# Patient Record
Sex: MALE | Race: WHITE | Employment: FULL TIME | URBAN - NONMETROPOLITAN AREA
[De-identification: names, ages, dates, MRNs, and addresses within clinical notes are randomized per-mention and may not be internally consistent; named-entity substitution may affect disease eponyms.]

---

## 2020-01-08 ENCOUNTER — HOSPITAL ENCOUNTER (EMERGENCY)
Age: 50
Discharge: HOME | End: 2020-01-08
Attending: EMERGENCY MEDICINE
Payer: COMMERCIAL

## 2020-01-08 VITALS
HEART RATE: 99 BPM | OXYGEN SATURATION: 98 % | SYSTOLIC BLOOD PRESSURE: 123 MMHG | TEMPERATURE: 97.4 F | RESPIRATION RATE: 18 BRPM | DIASTOLIC BLOOD PRESSURE: 86 MMHG

## 2020-01-08 LAB
ABSOLUTE EOS #: 0.45 K/UL (ref 0–0.44)
ABSOLUTE IMMATURE GRANULOCYTE: <0.03 K/UL (ref 0–0.3)
ABSOLUTE LYMPH #: 2.05 K/UL (ref 1.1–3.7)
ABSOLUTE MONO #: 0.73 K/UL (ref 0.1–1.2)
ANION GAP SERPL CALCULATED.3IONS-SCNC: 15 MMOL/L (ref 9–17)
BASOPHILS # BLD: 1 % (ref 0–2)
BASOPHILS ABSOLUTE: 0.08 K/UL (ref 0–0.2)
BUN BLDV-MCNC: 13 MG/DL (ref 6–20)
BUN/CREAT BLD: 16 (ref 9–20)
CALCIUM SERPL-MCNC: 8.9 MG/DL (ref 8.6–10.4)
CHLORIDE BLD-SCNC: 96 MMOL/L (ref 98–107)
CO2: 24 MMOL/L (ref 20–31)
CREAT SERPL-MCNC: 0.79 MG/DL (ref 0.7–1.2)
DIFFERENTIAL TYPE: ABNORMAL
EOSINOPHILS RELATIVE PERCENT: 6 % (ref 1–4)
GFR AFRICAN AMERICAN: >60 ML/MIN
GFR NON-AFRICAN AMERICAN: >60 ML/MIN
GFR SERPL CREATININE-BSD FRML MDRD: ABNORMAL ML/MIN/{1.73_M2}
GFR SERPL CREATININE-BSD FRML MDRD: ABNORMAL ML/MIN/{1.73_M2}
GLUCOSE BLD-MCNC: 103 MG/DL (ref 70–99)
HCT VFR BLD CALC: 44.6 % (ref 40.7–50.3)
HEMOGLOBIN: 15.3 G/DL (ref 13–17)
IMMATURE GRANULOCYTES: 0 %
LYMPHOCYTES # BLD: 25 % (ref 24–43)
MAGNESIUM: 2.1 MG/DL (ref 1.6–2.6)
MCH RBC QN AUTO: 31.1 PG (ref 25.2–33.5)
MCHC RBC AUTO-ENTMCNC: 34.3 G/DL (ref 28.4–34.8)
MCV RBC AUTO: 90.7 FL (ref 82.6–102.9)
MONOCYTES # BLD: 9 % (ref 3–12)
NRBC AUTOMATED: 0 PER 100 WBC
PDW BLD-RTO: 12.1 % (ref 11.8–14.4)
PLATELET # BLD: 290 K/UL (ref 138–453)
PLATELET ESTIMATE: ABNORMAL
PMV BLD AUTO: 9.3 FL (ref 8.1–13.5)
POTASSIUM SERPL-SCNC: 3.3 MMOL/L (ref 3.7–5.3)
RBC # BLD: 4.92 M/UL (ref 4.21–5.77)
RBC # BLD: ABNORMAL 10*6/UL
SEG NEUTROPHILS: 59 % (ref 36–65)
SEGMENTED NEUTROPHILS ABSOLUTE COUNT: 4.78 K/UL (ref 1.5–8.1)
SODIUM BLD-SCNC: 135 MMOL/L (ref 135–144)
TROPONIN INTERP: NORMAL
TROPONIN T: <0.03 NG/ML
TROPONIN, HIGH SENSITIVITY: NORMAL NG/L (ref 0–22)
TSH SERPL DL<=0.05 MIU/L-ACNC: 12.18 MIU/L (ref 0.3–5)
WBC # BLD: 8.1 K/UL (ref 3.5–11.3)
WBC # BLD: ABNORMAL 10*3/UL

## 2020-01-08 PROCEDURE — 93005 ELECTROCARDIOGRAM TRACING: CPT | Performed by: EMERGENCY MEDICINE

## 2020-01-08 PROCEDURE — 84484 ASSAY OF TROPONIN QUANT: CPT

## 2020-01-08 PROCEDURE — 99283 EMERGENCY DEPT VISIT LOW MDM: CPT

## 2020-01-08 PROCEDURE — 6370000000 HC RX 637 (ALT 250 FOR IP): Performed by: EMERGENCY MEDICINE

## 2020-01-08 PROCEDURE — 85025 COMPLETE CBC W/AUTO DIFF WBC: CPT

## 2020-01-08 PROCEDURE — 36415 COLL VENOUS BLD VENIPUNCTURE: CPT

## 2020-01-08 PROCEDURE — 80048 BASIC METABOLIC PNL TOTAL CA: CPT

## 2020-01-08 PROCEDURE — 84443 ASSAY THYROID STIM HORMONE: CPT

## 2020-01-08 PROCEDURE — 83735 ASSAY OF MAGNESIUM: CPT

## 2020-01-08 RX ORDER — CLONIDINE HYDROCHLORIDE 0.1 MG/1
0.2 TABLET ORAL ONCE
Status: COMPLETED | OUTPATIENT
Start: 2020-01-08 | End: 2020-01-08

## 2020-01-08 RX ADMIN — CLONIDINE HYDROCHLORIDE 0.2 MG: 0.1 TABLET ORAL at 22:18

## 2020-01-09 LAB
EKG ATRIAL RATE: 89 BPM
EKG P AXIS: 56 DEGREES
EKG P-R INTERVAL: 150 MS
EKG Q-T INTERVAL: 388 MS
EKG QRS DURATION: 100 MS
EKG QTC CALCULATION (BAZETT): 472 MS
EKG R AXIS: 57 DEGREES
EKG T AXIS: 32 DEGREES
EKG VENTRICULAR RATE: 89 BPM

## 2020-01-09 PROCEDURE — 93010 ELECTROCARDIOGRAM REPORT: CPT | Performed by: FAMILY MEDICINE

## 2020-01-09 ASSESSMENT — ENCOUNTER SYMPTOMS
VOMITING: 0
NAUSEA: 0
BACK PAIN: 0
SORE THROAT: 0
ABDOMINAL PAIN: 0
COUGH: 0
COLOR CHANGE: 0
SHORTNESS OF BREATH: 0

## 2020-01-09 NOTE — ED PROVIDER NOTES
[facility] ED  eMERGENCY dEPARTMENT eNCOUnter      Pt Name: Angel Mcmillan  MRN: [id number]  [place] 1970  Date of evaluation: 1/8/2020  Provider: Yee Hyatt DO     CHIEF COMPLAINT       Chief Complaint   Patient presents with    Hypertension     takes B/P at home and is reading high, feels weird         HISTORY OF PRESENT ILLNESS   (Location/Symptom, Timing/Onset, Context/Setting, Quality, Duration, Modifying Factors, Severity) Note limiting factors. HPI    Angel Mcmillan is a 44 y.o. male who presents to the emergency department with complaint of hypertension and palpitations. Patient reports that he has a past medical history of hypertension. He states that he takes Norvasc for this. He reports that this evening he was \"not feeling right\". He states he felt his heart was beating fast and he checked his blood pressure and it was elevated from his baseline. He denies any chest pain shortness of breath headache or change in vision. He states that he has a coworker who recently underwent triple bypass and patient had concern for his heart and secondary to this presents for evaluation    Nursing Notes were reviewed. REVIEW OF SYSTEMS    (2+ for level 4; 10+ for level 5)   Review of Systems   Constitutional: Negative for chills and fever. HENT: Negative for congestion and sore throat. Eyes: Negative for visual disturbance. Respiratory: Negative for cough and shortness of breath. Cardiovascular: Positive for palpitations. Negative for chest pain and leg swelling. Gastrointestinal: Negative for abdominal pain, nausea and vomiting. Musculoskeletal: Negative for back pain. Skin: Negative for color change. Neurological: Negative for dizziness, weakness, light-headedness and headaches. All other systems reviewed and are negative.       PAST MEDICAL HISTORY     Past Medical History:   Diagnosis Date    Essential hypertension     Mixed hyperlipidemia        SURGICAL HISTORY       Past Surgical History:   Procedure Laterality Date    VASECTOMY  2003       CURRENT MEDICATIONS       Discharge Medication List as of 1/8/2020 11:29 PM      CONTINUE these medications which have NOT CHANGED    Details   amLODIPine (NORVASC) 5 MG tablet Take 1 tablet by mouth daily, Disp-90 tablet, R-3Normal      simvastatin (ZOCOR) 40 MG tablet Take 1 tablet by mouth nightly, Disp-90 tablet, R-3Normal             ALLERGIES     Patient has no known allergies.     FAMILY HISTORY       Family History   Problem Relation Age of Onset    High Blood Pressure Mother         SOCIAL HISTORY       Social History     Socioeconomic History    Marital status:      Spouse name: Not on file    Number of children: Not on file    Years of education: Not on file    Highest education level: Not on file   Occupational History     Employer: [employer]   Social Needs    Financial resource strain: Not hard at all   Gildardo-Art insecurity:     Worry: Never true     Inability: Never true    Transportation needs:     Medical: No     Non-medical: No   Tobacco Use    Smoking status: Former Smoker    Smokeless tobacco: Never Used    Tobacco comment: Quit 1999   Substance and Sexual Activity    Alcohol use: No     Alcohol/week: 0.0 standard drinks    Drug use: No    Sexual activity: Not on file   Lifestyle    Physical activity:     Days per week: 0 days     Minutes per session: 0 min    Stress: Not at all   Relationships    Social connections:     Talks on phone: Not on file     Gets together: Not on file     Attends Anabaptist service: Not on file     Active member of club or organization: Not on file     Attends meetings of clubs or organizations: Not on file     Relationship status: Not on file    Intimate partner violence:     Fear of current or ex partner: No     Emotionally abused: No     Physically abused: No     Forced sexual activity: No   Other Topics Concern    Not on file   Social History Narrative    Not on file       SCREENINGS    Mag Coma Scale  Eye Opening: Spontaneous  Best Verbal Response: Oriented  Best Motor Response: Obeys commands  [place] Coma Scale Score: 15      PHYSICAL EXAM    (up to 7 for level 4, 8 or more for level 5)   @[email or web address]    Physical Exam  Vitals signs and nursing note reviewed. Constitutional:       General: He is not in acute distress. Appearance: Normal appearance. He is not ill-appearing, toxic-appearing or diaphoretic. HENT:      Head: Normocephalic and atraumatic. Nose: Nose normal.      Mouth/Throat:      Mouth: Mucous membranes are moist.      Pharynx: Oropharynx is clear. No oropharyngeal exudate or posterior oropharyngeal erythema. Eyes:      General: No scleral icterus. Right eye: No discharge. Left eye: No discharge. Extraocular Movements: Extraocular movements intact. Conjunctiva/sclera: Conjunctivae normal.      Pupils: Pupils are equal, round, and reactive to light. Neck:      Musculoskeletal: Normal range of motion and neck supple. No neck rigidity or muscular tenderness. Vascular: No carotid bruit. Cardiovascular:      Rate and Rhythm: Normal rate and regular rhythm. Pulses: Normal pulses. Heart sounds: Normal heart sounds. No murmur. No friction rub. No gallop. Comments: Radial pulses are plus 2 out of 4 bilaterally they are equal and symmetric  Pulmonary:      Effort: Pulmonary effort is normal. No respiratory distress. Breath sounds: Normal breath sounds. No wheezing or rhonchi. Abdominal:      General: Abdomen is flat. Bowel sounds are normal. There is no distension. Palpations: Abdomen is soft. There is no mass. Tenderness: There is no tenderness. There is no guarding. Comments: No voluntary guarding or rigidity no pulsatile mass   Musculoskeletal: Normal range of motion. General: No swelling, tenderness, deformity or signs of injury.       Comments: No asymmetric tablet 0.2 mg (0.2 mg Oral Given 1/8/20 2218)       MDM. Patient arrived to the ER hypertensive but otherwise had no complaints or exam findings concerning for endorgan damage. However with his concern for cardiac damage from the blood pressure a work-up was obtained. Work-up revealed changes consistent with hypothyroidism otherwise no clinically significant findings. After medication with clonidine the patient's blood pressure normalized and therefore with overall negative work-up he will be discharged home with outpatient follow-up. REVAL:         CRITICAL CARE TIME   Total Critical Care time was minutes, excluding separately reportable procedures. There was a high probability of clinically significant/life threatening deterioration in the patient's condition which required my urgent intervention. CONSULTS:  None    PROCEDURES:  Unless otherwise noted below, none     Procedures    FINAL IMPRESSION      1. Accelerated hypertension    2. Hypothyroidism, unspecified type          DISPOSITION/PLAN   DISPOSITION Decision To Discharge 01/08/2020 11:28:50 PM      PATIENT REFERRED TO:  Poonam Borges MD  [place] [address], [address] [place] [address]  [phone]    Schedule an appointment as soon as possible for a visit in 5 days  For repeat evaluation      DISCHARGE MEDICATIONS:  Discharge Medication List as of 1/8/2020 11:29 PM             (Please note:  Portions of this note were completed with a voice recognition program.  Efforts were made to edit the dictations but occasionally words and phrases are mis-transcribed.)  Form v2016. J.5-cn    Albania Lafleur DO (electronically signed)  Emergency Medicine Provider        DO Lisa  01/09/20 0896

## 2020-02-20 ENCOUNTER — HOSPITAL ENCOUNTER (OUTPATIENT)
Dept: NON INVASIVE DIAGNOSTICS | Age: 50
Discharge: HOME | End: 2020-02-20
Payer: COMMERCIAL

## 2020-02-20 PROCEDURE — A9500 TC99M SESTAMIBI: HCPCS | Performed by: INTERNAL MEDICINE

## 2020-02-20 PROCEDURE — 3430000000 HC RX DIAGNOSTIC RADIOPHARMACEUTICAL: Performed by: INTERNAL MEDICINE

## 2020-02-20 PROCEDURE — 78452 HT MUSCLE IMAGE SPECT MULT: CPT

## 2020-02-20 PROCEDURE — 93017 CV STRESS TEST TRACING ONLY: CPT

## 2020-02-20 RX ADMIN — Medication 30 MILLICURIE: at 10:03

## 2020-02-21 ENCOUNTER — HOSPITAL ENCOUNTER (OUTPATIENT)
Dept: NON INVASIVE DIAGNOSTICS | Age: 50
Discharge: HOME | End: 2020-02-21
Payer: COMMERCIAL

## 2020-02-21 PROCEDURE — 3430000000 HC RX DIAGNOSTIC RADIOPHARMACEUTICAL: Performed by: INTERNAL MEDICINE

## 2020-02-21 PROCEDURE — A9500 TC99M SESTAMIBI: HCPCS | Performed by: INTERNAL MEDICINE

## 2020-02-21 PROCEDURE — 78452 HT MUSCLE IMAGE SPECT MULT: CPT

## 2020-02-21 RX ADMIN — Medication 30 MILLICURIE: at 12:32

## 2020-02-24 NOTE — PROCEDURES
[facility], [address] [place] [address]                              CARDIAC STRESS TEST    PATIENT NAME: Teri Young                 :        1970  MED REC NO:   [id number]                              ROOM:  ACCOUNT NO:   [de-identified]                           ADMIT DATE: 2020  PROVIDER:     Verito Renee. Elian Arenas    CARDIOVASCULAR DIAGNOSTIC DEPARTMENT    DATE OF STUDY:  2020    ORDERING PROVIDER:  Radha King. Jalyn Loyola MD    PRIMARY CARE PROVIDER:  Radha King. Jalyn Loyola MD    INTERPRETING PHYSICIAN:  James Arenas MD    EXERCISE MYOCARDIAL PERFUSION STRESS TEST REPORT    Stress/Rest single-isotope SPECT imaging with exercise stress and gated  SPECT imaging    INDICATION:  Assessment of recent chest pain and/or discomfort. CLINICAL HISTORY:  The patient is a 64-year-old man with no known  coronary artery disease. Previous cardiac history includes:  None. Other previous history includes:  Chest pain, palpitations, dyspnea,  hypertension. Family history of CAD <60 in father, MI at age 54. Symptoms just prior to testing included:  None. Relevant medications:  Amlodipine. PROCEDURE:  The patient performed treadmill exercise using a Ayad  protocol, completing 6:00 minutes and completing an estimated workload  of 9.36 metabolic equivalents (METS). The test was terminated due to ST depression, elevated blood pressure. The heart rate was 84 beats per minute at baseline and increased to 160  beats per minute at peak exercise, which was 95% of the maximum  predicted heart rate. The rest blood pressure was 140/84 mmHg and  increased to 218/102 mmHg, which is a hypertensive response. During the  procedure, the patient developed fatigue and shortness of breath but  denied any chest discomfort. Myocardial perfusion imaging: Imaging was performed at rest 30-45  minutes following the injection of 30.0 mCi of sestamibi.   At

## 2020-03-17 ENCOUNTER — OFFICE VISIT (OUTPATIENT)
Dept: CARDIOLOGY | Age: 50
End: 2020-03-17
Payer: COMMERCIAL

## 2020-03-17 VITALS
HEIGHT: 73 IN | OXYGEN SATURATION: 97 % | HEART RATE: 102 BPM | DIASTOLIC BLOOD PRESSURE: 99 MMHG | SYSTOLIC BLOOD PRESSURE: 152 MMHG | WEIGHT: 228 LBS | RESPIRATION RATE: 18 BRPM | BODY MASS INDEX: 30.22 KG/M2

## 2020-03-17 PROCEDURE — 99243 OFF/OP CNSLTJ NEW/EST LOW 30: CPT | Performed by: FAMILY MEDICINE

## 2020-03-17 NOTE — PATIENT INSTRUCTIONS
SURVEY:    You may be receiving a survey from [employer] regarding your visit today. Please complete the survey to enable us to provide the highest quality of care to you and your family. If you cannot score us a very good on any question, please call the office to discuss how we could have made your experience a very good one. Thank you.

## 2020-03-17 NOTE — PROGRESS NOTES
Yoel Rasheed am scribing for and in the presence of Roman Jose. Kasey FARAH, MS, F.A.C.C..    Patient: Nitesh Almeida  : 1970  Date of Visit: 2020    REASON FOR VISIT / CONSULTATION: Establish Cardiologist (Hx:HTN. Pt is here to establish cardiac care after an equivocal stress test. he had it done because he had high blood pressure readings and some chest tightness. Denies: CP, SOB, dizziness/lightheaded, palpitations.)      History of Present Illness:         Dear Pepe Hawthorne MD,    I had the pleasure of seeing Nitesh Almeida in my office today. Mr. Brianna Blair is a 44 y.o. male who recently underwent a cardiovascular stress test because of a history of chest discomfort which shown evidence of reversible ischemia. His family history includes his father had a heart attack at 54. He is a former smoker of only 4 years and quit about 30 years ago. He had a stress test done on 20 where the myocardial imaging was largely normal with an EF of 55%. However, the treadmill stress test showed a Vieira score of 1 which is intermediate risk for coronary artery disease. Mr. Brianna Blair denied any chest pain during the procedure. ·  Exercise Tolerance: Mr. Brianna Blair reports that he has an excellent exercise tolerance. He says that he works out daily. Mr. Brianna Blair is here to establish cardiac care after being referred from Dr Bandar Lang for his abnormal stress test. He denies any personal history of cardia problems other than recently diagnosed high blood pressure and a thyroid disorder, both of which are now being treated. When he 1st came to the ER, he reports having a kind of \"anxiety attack\" associated with chest pressure which he says was resolved once he was told his ECG was unremarkable. None the less, because of his family history of early coronary artery disease, is led to this stress test being done. He says it last minutes and went away once he got the ED.  He has not had any of these and at most low-intermediate risk and based on his atypical symptoms and relatively unremarkable echocardiogram, I honestly do not think that further workup for a cardiac source of his symptoms is likely indicated. Having said this, I did reiterate the reality that no test is 100% sensitive and therefore if symptoms persist, worsen and/or clinical suspicion for significant ischemic coronary artery disease remains high, further testing including the possibility of cardiac catheterization may be appropriate to reconsider. In the end, Mr. Marni Galarza said that he was not really worried about the symptoms and therefore at least for the time being preferred to not go through with anymore diagnostic cardiac testing or treatment unless absolutely necessary. I do thin this is a reasonable approach, however I told him to be sure to call you or call our office if his symptoms worsened and/or if he changed his mind about this approach and at that point we could reconsider additional testing and/or treatment strategies. Essential Hypertension: Borderline controlled  · Calcium Channel Blocker: Continue amlodipine (Norvasc) 5 mg once daily. He says that at home his BP's are more in the 120's. I told him to continue to monitor this and call if it is regularly elevated. · Additional testing: I ordered a echocardiogram to better assess for the etiology of this problem and to help guide future management. · Hyperlipidemia: Mixed LDL 83 mg/dL on 11/4/19  · Cholesterol Reduction Therapy: Continue simvastatin (Zocor) 40 mg daily. In the meantime, I encouraged Mr. Marni Galarza to continue to take his other medications. FOLLOW UP:   I told Mr. Marni Galarza to call my office if he had any problems, but otherwise I asked him to Return if symptoms worsen or fail to improve. However, I would be happy to see him sooner should the need arise. Sincerely,  David Parks MD, MS, F.A.C.C.   [facility] Cardiology Specialist [address] [facility] [address], [place], [address] [place] [address]   Phone: [phone], Fax: [phone]     I believe that the risk of significant morbidity and mortality related to the patient's current medical conditions are: Intermediate. The documentation recorded by the scribe, accurately and completely reflects the services I personally performed and the decisions made by me. Thelma Go MD, MS, F.A.C.C.  March 17, 2020

## 2020-04-07 ENCOUNTER — HOSPITAL ENCOUNTER (OUTPATIENT)
Dept: NON INVASIVE DIAGNOSTICS | Age: 50
Discharge: HOME | End: 2020-04-07
Payer: COMMERCIAL

## 2020-04-07 LAB
LV EF: 60 %
LVEF MODALITY: NORMAL

## 2020-04-07 PROCEDURE — 93306 TTE W/DOPPLER COMPLETE: CPT

## 2020-04-09 ENCOUNTER — TELEPHONE (OUTPATIENT)
Dept: CARDIOLOGY | Age: 50
End: 2020-04-09

## 2020-04-09 NOTE — TELEPHONE ENCOUNTER
----- Message from Enrico Arrieta MD sent at 4/8/2020 11:12 PM EDT -----  Let Mr. Wilson know their test result was ok. Thanks.

## 2023-04-10 PROBLEM — Z00.00 WELLNESS EXAMINATION: Status: ACTIVE | Noted: 2023-04-10

## 2023-05-10 PROBLEM — Z00.00 WELLNESS EXAMINATION: Status: RESOLVED | Noted: 2023-04-10 | Resolved: 2023-05-10

## 2024-04-24 ENCOUNTER — TELEPHONE (OUTPATIENT)
Dept: GASTROENTEROLOGY | Age: 54
End: 2024-04-24

## 2024-12-12 ENCOUNTER — OFFICE VISIT (OUTPATIENT)
Dept: PRIMARY CARE CLINIC | Age: 54
End: 2024-12-12

## 2024-12-12 VITALS
HEART RATE: 106 BPM | WEIGHT: 243 LBS | RESPIRATION RATE: 18 BRPM | SYSTOLIC BLOOD PRESSURE: 144 MMHG | BODY MASS INDEX: 32.96 KG/M2 | OXYGEN SATURATION: 96 % | DIASTOLIC BLOOD PRESSURE: 90 MMHG

## 2024-12-12 DIAGNOSIS — Z00.00 WELL ADULT EXAM: ICD-10-CM

## 2024-12-12 DIAGNOSIS — Z12.5 SCREENING PSA (PROSTATE SPECIFIC ANTIGEN): ICD-10-CM

## 2024-12-12 DIAGNOSIS — I10 HYPERTENSION, UNSPECIFIED TYPE: Primary | ICD-10-CM

## 2024-12-12 DIAGNOSIS — E78.5 HYPERLIPIDEMIA, UNSPECIFIED HYPERLIPIDEMIA TYPE: ICD-10-CM

## 2024-12-12 DIAGNOSIS — E03.9 HYPOTHYROIDISM, UNSPECIFIED TYPE: ICD-10-CM

## 2024-12-12 DIAGNOSIS — E66.811 CLASS 1 OBESITY WITH BODY MASS INDEX (BMI) OF 30.0 TO 30.9 IN ADULT, UNSPECIFIED OBESITY TYPE, UNSPECIFIED WHETHER SERIOUS COMORBIDITY PRESENT: ICD-10-CM

## 2024-12-12 ASSESSMENT — PATIENT HEALTH QUESTIONNAIRE - PHQ9
SUM OF ALL RESPONSES TO PHQ QUESTIONS 1-9: 0
SUM OF ALL RESPONSES TO PHQ9 QUESTIONS 1 & 2: 0
2. FEELING DOWN, DEPRESSED OR HOPELESS: NOT AT ALL
1. LITTLE INTEREST OR PLEASURE IN DOING THINGS: NOT AT ALL

## 2024-12-12 NOTE — PROGRESS NOTES
[facility] PRIMARY CARE  [address] [place] [address] , [address]  [place], [place], [address]    Obed Wilson is a 53 y.o. male with  has a past medical history of COVID-19 virus infection / 2021, Essential hypertension, History of stress test, and Mixed hyperlipidemia.  Presented to the office today for:  Chief Complaint   Patient presents with    New Patient       Assessment/Plan   1. Hypertension, unspecified type  2. Hyperlipidemia, unspecified hyperlipidemia type  3. Hypothyroidism, unspecified type  4. Screening PSA (prostate specific antigen)  5. Class 1 obesity with body mass index (BMI) of 30.0 to 30.9 in adult, unspecified obesity type, unspecified whether serious comorbidity present  6. Well adult exam  -     CBC with Auto Differential; Future  -     Comprehensive Metabolic Panel; Future  -     Lipid Panel; Future  -     PSA Screening; Future  -     Vitamin D 25 Hydroxy; Future  Return in about 20 weeks (around 5/1/2025) for Well Adult.  Assessment & Plan  HTN - continue to monitor over 1-2 week's or so and obtain BP readings  Continue w/ amlodipine at 5mg PO daily, depending on readings then may consider increasing this dose  Continue w/ zocor 40mg PO daily and cont to monitor diet  Synthroid to be continued at 75 mcg PO daily     All patient questions answered.  Pt voiced understanding.   There are no discontinued medications.    Patient received counseling on the following healthy behaviors: nutrition, exercise and medication adherence. I encouraged and discussed lifestyle modifications including diet and exercise (150+ minutes of moderate-high intensity) and the patient was agreeable to making positive/beneficial changes to both to help improve their overall health. Discussed use, benefit, and side effects of prescribed medications.  Barriers to medication compliance addressed. Patient given educational materials: see patient instructions.     HM - HM items completed today as per orders.

## 2025-01-11 PROBLEM — Z12.5 SCREENING PSA (PROSTATE SPECIFIC ANTIGEN): Status: RESOLVED | Noted: 2024-12-12 | Resolved: 2025-01-11

## 2025-04-19 LAB
ALBUMIN: 4.7 G/DL (ref 3.5–5.2)
ALK PHOSPHATASE: 48 U/L (ref 39–118)
ALT SERPL-CCNC: 14 U/L (ref 5–41)
ANION GAP SERPL CALCULATED.3IONS-SCNC: 11 MMOL/L (ref 7–16)
AST SERPL-CCNC: 18 U/L (ref 9–50)
BASOPHILS ABSOLUTE: 0.08 K/UL (ref 0–0.2)
BASOPHILS RELATIVE PERCENT: 1.3 % (ref 0–2)
BILIRUB SERPL-MCNC: 1.3 MG/DL
BUN BLDV-MCNC: 11 MG/DL (ref 6–20)
CALCIUM SERPL-MCNC: 8.8 MG/DL (ref 8.6–10.5)
CHLORIDE BLD-SCNC: 107 MMOL/L (ref 96–107)
CHOLESTEROL, TOTAL: 154 MG/DL (ref 100–199)
CHOLESTEROL/HDL RATIO: 3.6 (ref 2–4.5)
CO2: 26 MMOL/L (ref 18–32)
CREAT SERPL-MCNC: 0.86 MG/DL (ref 0.67–1.3)
EGFR IF NONAFRICAN AMERICAN: 102 ML/MIN/1.73M2
EOSINOPHILS ABSOLUTE: 0.48 K/UL (ref 0–0.8)
EOSINOPHILS RELATIVE PERCENT: 7.8 % (ref 0–5)
GLUCOSE: 104 MG/DL (ref 70–100)
HCT VFR BLD CALC: 46.1 % (ref 39–52)
HDLC SERPL-MCNC: 43 MG/DL
HEMOGLOBIN: 15.6 G/DL (ref 13–18)
IMMATURE GRANS (ABS): 0.01 K/UL (ref 0–0.06)
IMMATURE GRANULOCYTES %: 0.2 % (ref 0–2)
LDL CHOLESTEROL: 88 MG/DL
LDL/HDL RATIO: 2
LYMPHOCYTES ABSOLUTE: 2.05 K/UL (ref 0.9–5.2)
LYMPHOCYTES RELATIVE PERCENT: 33.4 % (ref 20–45)
MCH RBC QN AUTO: 31.6 PG (ref 26–32)
MCHC RBC AUTO-ENTMCNC: 33.8 G/DL (ref 32–35)
MCV RBC AUTO: 94 FL (ref 75–100)
MONOCYTES ABSOLUTE: 0.49 K/UL (ref 0.1–1)
MONOCYTES RELATIVE PERCENT: 8 % (ref 0–13)
NEUTROPHILS ABSOLUTE: 3.02 K/UL (ref 1.9–8)
NEUTROPHILS RELATIVE PERCENT: 49.3 % (ref 45–75)
PDW BLD-RTO: 12.4 % (ref 11.2–14.8)
PLATELET # BLD: 289 THOUS/CMM (ref 140–440)
POTASSIUM SERPL-SCNC: 4.3 MMOL/L (ref 3.5–5.4)
PSA, ULTRASENSITIVE: 0.63 NG/ML (ref 0–4)
RBC # BLD: 4.93 MILL/CMM (ref 4.4–6.1)
SODIUM BLD-SCNC: 144 MMOL/L (ref 135–148)
TOTAL PROTEIN: 7.2 G/DL (ref 6–8.3)
TRIGL SERPL-MCNC: 117 MG/DL (ref 20–149)
VITAMIN D 25-HYDROXY: 16.4 NG/ML (ref 30–100)
VLDLC SERPL CALC-MCNC: 23 MG/DL
WBC # BLD: 6.1 THDS/CMM (ref 3.6–11)

## 2025-04-21 ENCOUNTER — RESULTS FOLLOW-UP (OUTPATIENT)
Dept: PRIMARY CARE CLINIC | Age: 55
End: 2025-04-21

## 2025-05-07 ENCOUNTER — OFFICE VISIT (OUTPATIENT)
Dept: PRIMARY CARE CLINIC | Age: 55
End: 2025-05-07
Payer: COMMERCIAL

## 2025-05-07 VITALS
WEIGHT: 240.4 LBS | HEIGHT: 72 IN | BODY MASS INDEX: 32.56 KG/M2 | OXYGEN SATURATION: 95 % | HEART RATE: 90 BPM | DIASTOLIC BLOOD PRESSURE: 80 MMHG | RESPIRATION RATE: 16 BRPM | SYSTOLIC BLOOD PRESSURE: 132 MMHG

## 2025-05-07 DIAGNOSIS — Z00.00 WELL ADULT EXAM: Primary | ICD-10-CM

## 2025-05-07 DIAGNOSIS — I10 HYPERTENSION, UNSPECIFIED TYPE: ICD-10-CM

## 2025-05-07 DIAGNOSIS — E55.9 HYPOVITAMINOSIS D: ICD-10-CM

## 2025-05-07 DIAGNOSIS — Z12.11 ENCOUNTER FOR SCREENING FOR MALIGNANT NEOPLASM OF COLON: ICD-10-CM

## 2025-05-07 DIAGNOSIS — Z13.1 DIABETES MELLITUS SCREENING: ICD-10-CM

## 2025-05-07 DIAGNOSIS — E03.9 HYPOTHYROIDISM, UNSPECIFIED TYPE: ICD-10-CM

## 2025-05-07 DIAGNOSIS — E78.5 HYPERLIPIDEMIA, UNSPECIFIED HYPERLIPIDEMIA TYPE: ICD-10-CM

## 2025-05-07 PROCEDURE — 3075F SYST BP GE 130 - 139MM HG: CPT | Performed by: STUDENT IN AN ORGANIZED HEALTH CARE EDUCATION/TRAINING PROGRAM

## 2025-05-07 PROCEDURE — 99396 PREV VISIT EST AGE 40-64: CPT | Performed by: STUDENT IN AN ORGANIZED HEALTH CARE EDUCATION/TRAINING PROGRAM

## 2025-05-07 PROCEDURE — 3079F DIAST BP 80-89 MM HG: CPT | Performed by: STUDENT IN AN ORGANIZED HEALTH CARE EDUCATION/TRAINING PROGRAM

## 2025-05-07 SDOH — ECONOMIC STABILITY: FOOD INSECURITY: WITHIN THE PAST 12 MONTHS, THE FOOD YOU BOUGHT JUST DIDN'T LAST AND YOU DIDN'T HAVE MONEY TO GET MORE.: NEVER TRUE

## 2025-05-07 SDOH — ECONOMIC STABILITY: FOOD INSECURITY: WITHIN THE PAST 12 MONTHS, YOU WORRIED THAT YOUR FOOD WOULD RUN OUT BEFORE YOU GOT MONEY TO BUY MORE.: NEVER TRUE

## 2025-05-07 ASSESSMENT — PATIENT HEALTH QUESTIONNAIRE - PHQ9
SUM OF ALL RESPONSES TO PHQ QUESTIONS 1-9: 0
2. FEELING DOWN, DEPRESSED OR HOPELESS: NOT AT ALL
SUM OF ALL RESPONSES TO PHQ QUESTIONS 1-9: 0
SUM OF ALL RESPONSES TO PHQ QUESTIONS 1-9: 0
1. LITTLE INTEREST OR PLEASURE IN DOING THINGS: NOT AT ALL
SUM OF ALL RESPONSES TO PHQ QUESTIONS 1-9: 0

## 2025-05-07 NOTE — PROGRESS NOTES
[facility] PRIMARY CARE  [address] [place] [address] , [address]  [place], [place], [address]    Obed Wilson is a 54 y.o. male with  has a past medical history of COVID-19 virus infection / 2021, Essential hypertension, History of stress test, and Mixed hyperlipidemia.  Presented to the office today for:  Chief Complaint   Patient presents with    Annual Exam    Hypertension    Hyperlipidemia    Hypothyroidism       Assessment/Plan   1. Well adult exam  2. Encounter for screening for malignant neoplasm of colon  -     Fecal DNA Colorectal cancer screening (Cologuard)  3. Hyperlipidemia, unspecified hyperlipidemia type  -     CBC with Auto Differential; Future  -     Comprehensive Metabolic Panel; Future  -     Lipid Panel; Future  4. Hypertension, unspecified type  5. Hypothyroidism, unspecified type  -     TSH reflex to FT4; Future  6. Diabetes mellitus screening  -     Hemoglobin A1C; Future  7. Hypovitaminosis D  -     Vitamin D 25 Hydroxy; Future    Return in about 1 year (around 5/7/2026) for Well Adult.  Assessment & Plan    HTN - nearly at goal, continue to monitor, amlodipine at 5mg PO daily  Continue w/ zocor 40mg PO daily and cont to monitor diet at this time, well controlled.  Synthroid to be continued at 75 mcg PO daily  Vitamin D      All patient questions answered.  Pt voiced understanding.   There are no discontinued medications.    Patient received counseling on the following healthy behaviors: nutrition, exercise and medication adherence. I encouraged and discussed lifestyle modifications including diet and exercise (150+ minutes of moderate-high intensity) and the patient was agreeable to making positive/beneficial changes to both to help improve their overall health. Discussed use, benefit, and side effects of prescribed medications.  Barriers to medication compliance addressed. Patient given educational materials: see patient instructions.     HM - HM items completed today as per orders.

## 2025-05-27 LAB — NONINV COLON CA DNA+OCC BLD SCRN STL QL: NEGATIVE

## 2025-05-28 ENCOUNTER — RESULTS FOLLOW-UP (OUTPATIENT)
Dept: INTERNAL MEDICINE CLINIC | Age: 55
End: 2025-05-28

## 2025-06-06 PROBLEM — Z12.11 ENCOUNTER FOR SCREENING FOR MALIGNANT NEOPLASM OF COLON: Status: RESOLVED | Noted: 2025-05-07 | Resolved: 2025-06-06

## 2025-06-06 PROBLEM — Z13.1 DIABETES MELLITUS SCREENING: Status: RESOLVED | Noted: 2025-05-07 | Resolved: 2025-06-06

## 2025-06-06 PROBLEM — Z00.00 WELL ADULT EXAM: Status: RESOLVED | Noted: 2025-05-07 | Resolved: 2025-06-06
